# Patient Record
Sex: FEMALE | Race: WHITE
[De-identification: names, ages, dates, MRNs, and addresses within clinical notes are randomized per-mention and may not be internally consistent; named-entity substitution may affect disease eponyms.]

---

## 2023-03-16 ENCOUNTER — HOSPITAL ENCOUNTER (OUTPATIENT)
Dept: HOSPITAL 19 - LDRO | Age: 33
Discharge: HOME | End: 2023-03-16
Attending: OBSTETRICS & GYNECOLOGY
Payer: COMMERCIAL

## 2023-03-16 VITALS — SYSTOLIC BLOOD PRESSURE: 118 MMHG | HEART RATE: 81 BPM | DIASTOLIC BLOOD PRESSURE: 82 MMHG

## 2023-03-16 VITALS — SYSTOLIC BLOOD PRESSURE: 118 MMHG | DIASTOLIC BLOOD PRESSURE: 63 MMHG | HEART RATE: 79 BPM

## 2023-03-16 VITALS — HEART RATE: 79 BPM | SYSTOLIC BLOOD PRESSURE: 116 MMHG | DIASTOLIC BLOOD PRESSURE: 73 MMHG

## 2023-03-16 VITALS — HEART RATE: 84 BPM | SYSTOLIC BLOOD PRESSURE: 127 MMHG | DIASTOLIC BLOOD PRESSURE: 71 MMHG

## 2023-03-16 VITALS — SYSTOLIC BLOOD PRESSURE: 140 MMHG | DIASTOLIC BLOOD PRESSURE: 82 MMHG | HEART RATE: 82 BPM

## 2023-03-16 VITALS — HEART RATE: 78 BPM | DIASTOLIC BLOOD PRESSURE: 68 MMHG | SYSTOLIC BLOOD PRESSURE: 121 MMHG

## 2023-03-16 VITALS — WEIGHT: 227.52 LBS | HEIGHT: 65 IN | BODY MASS INDEX: 37.91 KG/M2

## 2023-03-16 VITALS — DIASTOLIC BLOOD PRESSURE: 79 MMHG | SYSTOLIC BLOOD PRESSURE: 135 MMHG | HEART RATE: 75 BPM

## 2023-03-16 DIAGNOSIS — Z3A.36: ICD-10-CM

## 2023-03-16 DIAGNOSIS — O16.3: Primary | ICD-10-CM

## 2023-03-16 LAB
ALBUMIN SERPL-MCNC: 2.7 GM/DL (ref 3.5–5)
ALP SERPL-CCNC: 126 U/L (ref 40–150)
ALT SERPL-CCNC: 16 U/L (ref 0–55)
ANION GAP SERPL CALC-SCNC: 10 MMOL/L (ref 7–16)
AST SERPL-CCNC: 15 U/L (ref 5–34)
BASOPHILS # BLD: 0 K/MM3 (ref 0–0.2)
BASOPHILS NFR BLD AUTO: 0.3 % (ref 0–2)
BILIRUB SERPL-MCNC: 0.2 MG/DL (ref 0.2–1.2)
BUN SERPL-MCNC: 11 MG/DL (ref 7–19)
CALCIUM SERPL-MCNC: 8.7 MG/DL (ref 8.4–10.2)
CHLORIDE SERPL-SCNC: 110 MMOL/L (ref 98–107)
CO2 SERPL-SCNC: 20 MMOL/L (ref 22–29)
CREAT SERPL-SCNC: 0.65 MG/DL (ref 0.57–1.11)
EOSINOPHIL # BLD: 0 K/MM3 (ref 0–0.7)
EOSINOPHIL NFR BLD: 0.2 % (ref 0–4)
ERYTHROCYTE [DISTWIDTH] IN BLOOD BY AUTOMATED COUNT: 14.6 % (ref 11.5–14.5)
GLUCOSE SERPL-MCNC: 100 MG/DL (ref 70–99)
GRANULOCYTES # BLD AUTO: 74.9 % (ref 42.2–75.2)
HCT VFR BLD AUTO: 33.7 % (ref 37–47)
HGB BLD-MCNC: 11.2 G/DL (ref 12.5–16)
KETONES UR STRIP.AUTO-MCNC: (no result) MG/DL
LYMPHOCYTES # BLD: 2 K/MM3 (ref 1.2–3.4)
LYMPHOCYTES NFR BLD: 18.3 % (ref 20–51)
MCH RBC QN AUTO: 27 PG (ref 27–31)
MCHC RBC AUTO-ENTMCNC: 33 G/DL (ref 33–37)
MCV RBC AUTO: 80 FL (ref 80–100)
MONOCYTES # BLD: 0.6 K/MM3 (ref 0.1–0.6)
MONOCYTES NFR BLD AUTO: 5.7 % (ref 1.7–9.3)
NEUTROPHILS # BLD: 8 K/MM3 (ref 1.4–6.5)
PH UR STRIP.AUTO: 6 [PH] (ref 5–8.5)
PLATELET # BLD AUTO: 226 K/MM3 (ref 130–400)
PMV BLD AUTO: 12.4 FL (ref 7.4–10.4)
POTASSIUM SERPL-SCNC: 3.5 MMOL/L (ref 3.5–4.5)
PROT SERPL-MCNC: 6.3 GM/DL (ref 6.2–8.1)
RBC # BLD AUTO: 4.23 M/MM3 (ref 4.1–5.3)
RBC # UR: (no result) /HPF (ref 0–2)
SODIUM SERPL-SCNC: 140 MMOL/L (ref 136–145)
SP GR UR STRIP.AUTO: >=1.03 (ref 1–1.03)
SQUAMOUS # URNS: (no result) /HPF (ref 0–10)
UA DIPSTICK PNL UR STRIP.AUTO: (no result)
URN COLLECT METHOD CLASS: (no result)
UROBILINOGEN UR STRIP.AUTO-MCNC: 0.2 E.U/DL (ref 0.2–1)

## 2023-03-16 NOTE — NUR
This nurse received report from Hilary Vidal RN.
 
Pt is stable, on external monitors x2, and waiting on lab results. When
results are received, notify Dr. Connolly of results for POC.
 
Care assumed by this nurse.

## 2023-03-16 NOTE — NUR
1635- Pt arrives ambulatory with complaints of a HA since 1200 today and
elevated BP at home. Pt into bed, EFM and TOCO on and tracing well. O2 sat
monitor on and tracing maternal HR. Assessments completed. Pt denies visual
disturbances, abdominal pain, swelling, UCs, LOF, VB. +FM per Pt and heard on
monitor.

## 2023-03-16 NOTE — NUR
[id number]: This nurse at pt bedside for introductions and discussion of POC.
FHR tracing intermittently due to pt position. This nurse at pt bedside
palpating pt abdomen, with consent and explanation, attempting to readjust
FHR monitor. POC discussed with pt. Lab results are pending and will be shared
with the provider and pt as soon as they are resulted. Questions, concerns,
needs encouraged. Pt verbalized understanding and agreement of POC with "no"
questions, concerns, or needs.
 
1833: This nurse received a call from the charge nurse Cathryn Zuñiga RN. Dr. Connolly notified her of pt lab results and orders given to DC pt endorsed.
 
1834: This nurse at pt bedside discussing POC and lab results. Pt off external
monitors x2 and informed of DC paperwork to discuss prior to pt DC. Questions,
concerns, needs encouraged. Pt denies questions, concerns, needs.

## 2023-03-16 NOTE — NUR
Pt POC for DC discussed with pt and pt spouse. Pt given health summary
discharge and education packet on Early Labor as well as Discomforts of
Pregnancy. Pt educated on keeping all scheduled appointments as well as
returning to unit if LOF, vaginal bleeding, and/or
consistent and persistent ctx occur. Pt also educated on being adequately
hydrated. Questions, concerns, and needs encouraged.
Pt curious about "how should I be taking my BP then? At home I have the
inflating cuff one." Pt educated on body position, legs not crossed, and being
in a low/no stimulation environment for BP readings. Pt verbalized
understanding and agreement of POC for DC with "no" additional questions,
concerns, or needs.
 
Pt ambulated off unit with belongings in tow, and DC paperwork as well as
health summary in hand, accompanied by pt spouse at 1854.